# Patient Record
Sex: MALE | Race: WHITE | NOT HISPANIC OR LATINO | Employment: FULL TIME | URBAN - METROPOLITAN AREA
[De-identification: names, ages, dates, MRNs, and addresses within clinical notes are randomized per-mention and may not be internally consistent; named-entity substitution may affect disease eponyms.]

---

## 2018-02-26 ENCOUNTER — OFFICE VISIT (OUTPATIENT)
Dept: NEUROLOGY | Facility: CLINIC | Age: 23
End: 2018-02-26
Payer: COMMERCIAL

## 2018-02-26 ENCOUNTER — LAB VISIT (OUTPATIENT)
Dept: LAB | Facility: HOSPITAL | Age: 23
End: 2018-02-26
Attending: PSYCHIATRY & NEUROLOGY
Payer: COMMERCIAL

## 2018-02-26 VITALS
SYSTOLIC BLOOD PRESSURE: 178 MMHG | HEIGHT: 70 IN | RESPIRATION RATE: 16 BRPM | DIASTOLIC BLOOD PRESSURE: 80 MMHG | BODY MASS INDEX: 25.44 KG/M2 | WEIGHT: 177.69 LBS | HEART RATE: 78 BPM

## 2018-02-26 DIAGNOSIS — G72.3 PERIODIC PARALYSIS: Primary | ICD-10-CM

## 2018-02-26 DIAGNOSIS — G72.3 PERIODIC PARALYSIS: ICD-10-CM

## 2018-02-26 LAB
ALBUMIN SERPL BCP-MCNC: 4.6 G/DL
ALP SERPL-CCNC: 66 U/L
ALT SERPL W/O P-5'-P-CCNC: 24 U/L
ANION GAP SERPL CALC-SCNC: 9 MMOL/L
AST SERPL-CCNC: 20 U/L
BASOPHILS # BLD AUTO: 0.05 K/UL
BASOPHILS NFR BLD: 0.7 %
BILIRUB SERPL-MCNC: 0.4 MG/DL
BUN SERPL-MCNC: 13 MG/DL
CALCIUM SERPL-MCNC: 10.3 MG/DL
CERULOPLASMIN SERPL-MCNC: 26 MG/DL
CHLORIDE SERPL-SCNC: 103 MMOL/L
CK SERPL-CCNC: 227 U/L
CO2 SERPL-SCNC: 30 MMOL/L
CREAT SERPL-MCNC: 1 MG/DL
DIFFERENTIAL METHOD: ABNORMAL
EOSINOPHIL # BLD AUTO: 0.3 K/UL
EOSINOPHIL NFR BLD: 4.4 %
ERYTHROCYTE [DISTWIDTH] IN BLOOD BY AUTOMATED COUNT: 12.4 %
EST. GFR  (AFRICAN AMERICAN): >60 ML/MIN/1.73 M^2
EST. GFR  (NON AFRICAN AMERICAN): >60 ML/MIN/1.73 M^2
GLUCOSE SERPL-MCNC: 111 MG/DL
HCT VFR BLD AUTO: 40.9 %
HGB BLD-MCNC: 13.9 G/DL
LACTATE SERPL-SCNC: 1.3 MMOL/L
LYMPHOCYTES # BLD AUTO: 1.8 K/UL
LYMPHOCYTES NFR BLD: 24 %
MCH RBC QN AUTO: 28.9 PG
MCHC RBC AUTO-ENTMCNC: 34 G/DL
MCV RBC AUTO: 85 FL
MONOCYTES # BLD AUTO: 0.9 K/UL
MONOCYTES NFR BLD: 12.6 %
NEUTROPHILS # BLD AUTO: 4.3 K/UL
NEUTROPHILS NFR BLD: 58.3 %
PLATELET # BLD AUTO: 219 K/UL
PMV BLD AUTO: 9.8 FL
POTASSIUM SERPL-SCNC: 4.4 MMOL/L
PROT SERPL-MCNC: 8.2 G/DL
RBC # BLD AUTO: 4.81 M/UL
SODIUM SERPL-SCNC: 142 MMOL/L
TSH SERPL DL<=0.005 MIU/L-ACNC: 1.99 UIU/ML
VIT B12 SERPL-MCNC: 405 PG/ML
WBC # BLD AUTO: 7.33 K/UL

## 2018-02-26 PROCEDURE — 84443 ASSAY THYROID STIM HORMONE: CPT

## 2018-02-26 PROCEDURE — 82607 VITAMIN B-12: CPT

## 2018-02-26 PROCEDURE — 82085 ASSAY OF ALDOLASE: CPT

## 2018-02-26 PROCEDURE — 99999 PR PBB SHADOW E&M-NEW PATIENT-LVL III: CPT | Mod: PBBFAC,,, | Performed by: PSYCHIATRY & NEUROLOGY

## 2018-02-26 PROCEDURE — 85025 COMPLETE CBC W/AUTO DIFF WBC: CPT

## 2018-02-26 PROCEDURE — 99204 OFFICE O/P NEW MOD 45 MIN: CPT | Mod: S$GLB,,, | Performed by: PSYCHIATRY & NEUROLOGY

## 2018-02-26 PROCEDURE — 80053 COMPREHEN METABOLIC PANEL: CPT

## 2018-02-26 PROCEDURE — 3008F BODY MASS INDEX DOCD: CPT | Mod: S$GLB,,, | Performed by: PSYCHIATRY & NEUROLOGY

## 2018-02-26 PROCEDURE — 82550 ASSAY OF CK (CPK): CPT

## 2018-02-26 PROCEDURE — 36415 COLL VENOUS BLD VENIPUNCTURE: CPT

## 2018-02-26 PROCEDURE — 83605 ASSAY OF LACTIC ACID: CPT

## 2018-02-26 PROCEDURE — 82390 ASSAY OF CERULOPLASMIN: CPT

## 2018-02-26 NOTE — PROGRESS NOTES
"    HPI: Sonu Osorio III is a 23 y.o. male with possible seizures or other spells. His father has the same but does not get any specific treatment. Patient's symptoms have been ongoing for years, after puberty. Spells occur variably- he has not had any spells for 2-3 days but can have 5 within an hour on some days. Spells last only about 30 seconds.   Spells are described as him first feeling his mouth or hands clinching up and he he has slurring of his speech. He has no pain or cramping. He is fully awake and thinks well during the spells per him, witnesses say his eyes appear wide and mouth droops to one side (but not always the same side). He also can have right or left leg tension (as well as in the hands) but he has had the same on both sides. There is posturing of the arms (either one or both arms). His neck will bend to the same side as the posturing. There is no jerking or rhythmic shaking and spells are so brief he can "Camoflouge" it by tieing his shoe. He feels no control over the symptoms- he just turns away from others if it happens. He feels paralyzed during spells.   He has no history of evolving tics.   Years ago, patient had EEG but never an MRI or CT of the head- he was told EEG was normal as discussed with him about Dr Davidson.   Patient's father had symptoms which he felt he grew out of and patient's sister has had some similar symptoms and was diagnosed with partial complex seizures but had automatisms and amnesia with her spells.   Otherwise have tried to video tape this but it happens too quick for this.  Patient feels movement triggers this.   States today's BP is an outlier  He is a student and works as [employer].   Review of Systems   Constitutional: Negative for fever.   HENT: Negative for nosebleeds.    Eyes: Negative for double vision.   Respiratory: Negative for hemoptysis.    Cardiovascular: Negative for leg swelling.   Gastrointestinal: Negative for blood in stool.   Genitourinary: " Negative for hematuria.   Musculoskeletal: Negative for falls.   Skin: Negative for rash.   Neurological: Negative for seizures.         I have reviewed all of this patient's past medical and surgical histories as well as family and social histories and active allergies and medications as documented in the electronic medical record.    Exam:  Gen Appearance, well developed/nourished in no apparent distress  CV: 2+ distal pulses with no edema or swelling  Neuro:  MS: Awake, alert, oriented to place, person, time, situation. Sustains attention. Recent/remote memory intact, Language is full to spontaneous speech/repetition/naming/comprehension. Fund of Knowledge is full  CN: Optic discs are flat with normal vasculature, PERRL, Extraoccular movements and visual fields are full. Normal facial sensation and strength, Hearing symmetric, Tongue and Palate are midline and strong. Shoulder Shrug symmetric and strong.  Motor: Normal bulk, tone, no abnormal movements. 5/5 strength bilateral upper/lower extremities with 2+ reflexes and bilateral plantar response  Sensory: symmetric to light touch, pain, temp, and vibration/proprioception. Romberg negative  Cerebellar: Finger-nose,Heal-shin, Rapid alternating movements intact  Gait: Normal stance, no ataxia      Assessment/Plan: Sonu Osorio III is a 23 y.o. male with abnormal involuntary movements, often after activities and paralyzing. A periodic paralysis or other neuromuscular disorde given likely genetic component.   I recommend:   1. MRI brain  to rule out structural lesion causing symptoms/findings.   2. Labs: CMP, CBC,copper/ceruloplasim,  TSh, B12, CK, aldolase,  lactic acid.   3. I asked the patient to take more extensive video of his spells. I will refer him to neuromuscular specialist for further diagnosis and treatment. May benefit exercise testing and/or EMG.  Patient would be interested in genetic evaluation at some point.   RTC 6 months

## 2018-02-28 LAB — ALDOLASE SERPL-CCNC: 8.1 U/L

## 2018-03-02 ENCOUNTER — HOSPITAL ENCOUNTER (OUTPATIENT)
Dept: RADIOLOGY | Facility: HOSPITAL | Age: 23
Discharge: HOME | End: 2018-03-02
Attending: PSYCHIATRY & NEUROLOGY
Payer: COMMERCIAL

## 2018-03-02 ENCOUNTER — TELEPHONE (OUTPATIENT)
Dept: NEUROLOGY | Facility: CLINIC | Age: 23
End: 2018-03-02

## 2018-03-02 DIAGNOSIS — G72.3 PERIODIC PARALYSIS: ICD-10-CM

## 2018-03-02 PROCEDURE — A9585 GADOBUTROL INJECTION: HCPCS | Performed by: PSYCHIATRY & NEUROLOGY

## 2018-03-02 PROCEDURE — 70553 MRI BRAIN STEM W/O & W/DYE: CPT | Mod: 26,,, | Performed by: RADIOLOGY

## 2018-03-02 PROCEDURE — 25500020 PHARM REV CODE 255: Performed by: PSYCHIATRY & NEUROLOGY

## 2018-03-02 PROCEDURE — 70553 MRI BRAIN STEM W/O & W/DYE: CPT | Mod: TC

## 2018-03-02 RX ORDER — GADOBUTROL 604.72 MG/ML
8 INJECTION INTRAVENOUS
Status: COMPLETED | OUTPATIENT
Start: 2018-03-02 | End: 2018-03-02

## 2018-03-02 RX ADMIN — GADOBUTROL 8 ML: 604.72 INJECTION INTRAVENOUS at 01:03

## 2018-03-02 NOTE — TELEPHONE ENCOUNTER
----- Message from Arcadio Fernandez sent at 3/2/2018  3:31 PM CST -----  The patient would like to speak to someone regarding scheduling his appointment. Please contact the patient to discuss further.

## 2018-03-26 NOTE — PROGRESS NOTES
Patient Name: Sonu Osorio III  MRN: [id number]    CC: Query periodic paralysis    HPI: Sonu Osorio III is a 23 y.o. male who is here today to discuss recurrent spells.  He says that they began to occur right after puberty. During a spell, he has episodic muscle contraction lasting 5-30sec and associated with an alteration in feeling, but he's aware of everything that is happening around him.     Sudden movement or exercise seems to bring them on. In a typical day, may have 10 or so, but on 'bad' days, may have too many to count. May go days/weeks without them.     Dad had them beginning in early teens and grew out of them around 19-21yo    Sister has CPS disorder - they look different and she has memory/awareness issues.     No cramping/weakness    HyperK+     HypoK+  Provocative factors    Provocactive factors  Exercise - no     Carbohydrates - no  Potassium load - no    Rest after exercise - no  Cold environment -no    Cold > Heat - no  Stress - no     Stress no  Fasting - don't miss meals       Ethanol - don't know     Alleviating factors  Carbohydrate intake - no  Mild exercise - no        Review of Systems   Constitutional: Negative for activity change, appetite change, chills, diaphoresis, fatigue, fever and unexpected weight change.   HENT: Negative for congestion, drooling, trouble swallowing and voice change.    Eyes: Negative for photophobia and visual disturbance.   Respiratory: Negative for choking, shortness of breath and stridor.    Cardiovascular: Negative for chest pain and leg swelling.   Gastrointestinal: Negative for abdominal pain, constipation, diarrhea, nausea and vomiting.   Endocrine: Negative for cold intolerance and heat intolerance.   Genitourinary: Negative for difficulty urinating, dysuria and urgency.   Musculoskeletal: Negative for back pain, gait problem, myalgias, neck pain and neck stiffness.   Skin: Negative for color change and rash.   Allergic/Immunologic: Negative for environmental  "allergies and food allergies.   Neurological: Negative for dizziness, tremors, facial asymmetry, speech difficulty, weakness, numbness and headaches.   Psychiatric/Behavioral: Negative for confusion, decreased concentration and hallucinations. The patient is not nervous/anxious.        Past Medical History  History reviewed. No pertinent past medical history.    Medications  No current outpatient prescriptions on file.    Allergies  Review of patient's allergies indicates:  No Known Allergies    Social History  Social History     Social History    Marital status: Single     Spouse name: N/A    Number of children: N/A    Years of education: N/A     Occupational History    Works at [employer]     Social History Main Topics    Smoking status: Never Smoker    Smokeless tobacco: Never Used    Alcohol use No    Drug use: No    Sexual activity: Not on file     Other Topics Concern    Not on file     Social History Narrative    No narrative on file       Family History  Family History   Problem Relation Age of Onset    Movement disorder Father        Physical Exam  /63   Pulse 62   Ht 5' 10" (1.778 m)   Wt 82.3 kg (181 lb 7 oz)   BMI 26.03 kg/m²     General appearance: Well-developed, well-groomed.     Neurologic Exam: The patient is awake, alert and oriented. Language is fluent. Recent and remote memory are normal. Attention span and concentration are normal. Fund of knowledge is appropriate.     Cranial nerves: pupils are round and reactive to light and accommodation. Visual fields are full to confrontation. Fundoscopic exam reveals sharp discs bilaterally, with good venous pulsations. Ocular motility is full in all cardinal positions of gaze. Facial sensation is normal to pinprick and light touch. Corneal reflexes are present bilaterally. Facial activation is symmetric. Hearing is normal bilaterally. Palate elevates symmetrically and gag reflex is intact bilaterally. Shoulder elevation is " symmetric and full strength bilaterally. Tongue is midline and neck rotation strength is normal bilaterally. Neck range of motion is normal.     Motor examination of all extremities demonstrates normal bulk and tone in all four limbs. There are no atrophy or fasciculations. Strength is 5/5 in the upper and lower extremities bilaterally without pronator drift.     Sensory examination is normal to pinprick, vibration and proprioception in the upper and lower extremities bilaterally. Romberg is negative.    Deep tendon reflexes are 2+ and symmetric in the upper and lower extremities bilaterally. Toes are mute bilaterally.     Gait: Normal heel, toe, tandem, and casual gait.    Coordination: Finger to nose and heel to shin testing is normal in both upper and lower extremities. Rapid alternating movements are normal in both upper and lower extremities.     General exam  Cardiovascular: regular rate and rhythm with no murmurs, rubs or gallops. There are no carotid or vertebral artery bruits. Pulses in both upper and lower extremities are symmetric. There is no peripheral edema.   Head and neck: no cervical lymphadenopathy      He showed me some short videos. In them, he will run in place for 10-15 seconds and then has flexion of right/fingers, right laterocollis, and what appears to be an alteration in his sensorium (wide, fixed eyes, postural imbalance). All of this transpires over about 5 seconds, so it required multiple viewings to detect all of the changes. He is back to baseline immediately.    Images:   Brain MRI 3/2/18: Normal      2/26/18  CK: 227  B12, TSH, CBC, CMP, Ceruloplasmin: nl      Assessment and Plan      Problem List Items Addressed This Visit     Movement disorder - Primary    Current Assessment & Plan     22yo male with several years of episodic spells, possibly provoked by stimulus.     DDX: paroxysmal kinesogenic dystonia, seizure, ischemia (vertebral or basilar insufficiency)    Plan  1. MRI/A  head/neck  2. Tilt table with EEG  3. If those two are normal, will refer to Dr Mcdonald for PKD         Relevant Orders    MRI Brain Without Contrast    MRA Brain without contrast    MRA Neck with and without contrast    Ambulatory Referral to Cardiology                Bipin Ortiz MD, BONILLA, FAAN  Department of Neurology  [facility] [place] [address]

## 2018-03-27 ENCOUNTER — OFFICE VISIT (OUTPATIENT)
Dept: NEUROLOGY | Facility: CLINIC | Age: 23
End: 2018-03-27
Payer: COMMERCIAL

## 2018-03-27 VITALS
DIASTOLIC BLOOD PRESSURE: 63 MMHG | HEIGHT: 70 IN | SYSTOLIC BLOOD PRESSURE: 138 MMHG | HEART RATE: 62 BPM | WEIGHT: 181.44 LBS | BODY MASS INDEX: 25.98 KG/M2

## 2018-03-27 DIAGNOSIS — G25.9 MOVEMENT DISORDER: Primary | ICD-10-CM

## 2018-03-27 PROCEDURE — 99999 PR PBB SHADOW E&M-EST. PATIENT-LVL III: CPT | Mod: PBBFAC,,, | Performed by: PSYCHIATRY & NEUROLOGY

## 2018-03-27 PROCEDURE — 99215 OFFICE O/P EST HI 40 MIN: CPT | Mod: S$GLB,,, | Performed by: PSYCHIATRY & NEUROLOGY

## 2018-03-27 NOTE — Clinical Note
Will need MRI/A, cardiology referral for Cata-Kimberly, tilt table with EEG,  and a referral to Dr Mcdonald after those are completed.

## 2018-03-27 NOTE — ASSESSMENT & PLAN NOTE
22yo male with several years of episodic spells, possibly provoked by stimulus.     DDX: paroxysmal kinesogenic dystonia, seizure, ischemia (vertebral or basilar insufficiency)    Plan  1. MRI/A head/neck  2. Tilt table with EEG  3. If those two are normal, will refer to Dr Mcdonald for PKD

## 2018-03-27 NOTE — LETTER
March 27, 2018      Gasper Lowe MD  [address] [address]           [facility]  [address]  [facility] [address]  Phone: [phone]  Fax: [phone]          Patient: Sonu Osorio III   MR Number: [id number]   YOB: 1995   Date of Visit: 3/27/2018       Dear Dr. Gasper Lowe:    Thank you for referring Sonu Osorio to me for evaluation. Attached you will find relevant portions of my assessment and plan of care.    If you have questions, please do not hesitate to call me. I look forward to following Sonu Osorio along with you.    Sincerely,    Arturo Ortiz MD    Enclosure  CC:  No Recipients    If you would like to receive this communication electronically, please contact [email or web address] or [phone] to request more information on [employer] Link access.    For providers and/or their staff who would like to refer a patient to [facility], please contact us through our one-stop-shop provider referral line, [facility], at [phone].    If you feel you have received this communication in error or would no longer like to receive these types of communications, please e-mail [email or web address]

## 2018-04-27 ENCOUNTER — INITIAL CONSULT (OUTPATIENT)
Dept: ELECTROPHYSIOLOGY | Facility: CLINIC | Age: 23
End: 2018-04-27
Payer: COMMERCIAL

## 2018-04-27 ENCOUNTER — HOSPITAL ENCOUNTER (OUTPATIENT)
Dept: CARDIOLOGY | Facility: CLINIC | Age: 23
Discharge: HOME | End: 2018-04-27
Payer: COMMERCIAL

## 2018-04-27 VITALS
WEIGHT: 183 LBS | HEART RATE: 66 BPM | BODY MASS INDEX: 26.2 KG/M2 | OXYGEN SATURATION: 98 % | HEIGHT: 70 IN | SYSTOLIC BLOOD PRESSURE: 119 MMHG | DIASTOLIC BLOOD PRESSURE: 68 MMHG

## 2018-04-27 DIAGNOSIS — R25.1 SPELLS OF TREMBLING: ICD-10-CM

## 2018-04-27 DIAGNOSIS — G25.9 MOVEMENT DISORDER: ICD-10-CM

## 2018-04-27 PROCEDURE — 93000 ELECTROCARDIOGRAM COMPLETE: CPT | Mod: S$GLB,,, | Performed by: INTERNAL MEDICINE

## 2018-04-27 PROCEDURE — 99999 PR PBB SHADOW E&M-EST. PATIENT-LVL III: CPT | Mod: PBBFAC,,, | Performed by: INTERNAL MEDICINE

## 2018-04-27 PROCEDURE — 99245 OFF/OP CONSLTJ NEW/EST HI 55: CPT | Mod: S$GLB,,, | Performed by: INTERNAL MEDICINE

## 2018-04-27 NOTE — PROGRESS NOTES
"  Subjective:   Patient ID:  Sonu Osorio III is a 23 y.o. male     Chief complaint:No chief complaint on file.      HPI  New patient to me.  Referred by Dr Ortiz  for spells   --   From his notes:  Sonu Osorio III is a 23 y.o. male with possible seizures or other spells. His father has the same but does not get any specific treatment. Patient's symptoms have been ongoing for years, after puberty. Spells occur variably- he has not had any spells for 2-3 days but can have 5 within an hour on some days. Spells last only about 30 seconds.   Spells are described as him first feeling his mouth or hands clinching up and he he has slurring of his speech. He has no pain or cramping. He is fully awake and thinks well during the spells per him, witnesses say his eyes appear wide and mouth droops to one side (but not always the same side). He also can have right or left leg tension (as well as in the hands) but he has had the same on both sides. There is posturing of the arms (either one or both arms). His neck will bend to the same side as the posturing. There is no jerking or rhythmic shaking and spells are so brief he can "Camouflage" it by tieing his shoe. He feels no control over the symptoms- he just turns away from others if it happens. He feels paralyzed during spells.   He has no history of evolving tics.   Years ago, patient had EEG but never an MRI or CT of the head- he was told EEG was normal as discussed with him about Dr Davidson.     He says that the spells began to occur right after puberty. During a spell, he has episodic muscle contraction lasting 5-30sec and associated with an alteration in feeling, but he's aware of everything that is happening around him.   Sudden movement or exercise seems to bring them on. In a typical day, may have 10 or so, but on 'bad' days, may have too many to count. May go days/weeks without them. Dad had them beginning in early teens and grew out of them around 19-21yo   Sister " has CPS disorder - they look different and she has memory/awareness issues.      No cramping/weakness     On a subsequent visit patient  showed  some short videos. In them, he will run in place for 10-15 seconds and then has flexion of right/fingers, right laterocollis, and what appears to be an alteration in his sensorium (wide, fixed eyes, postural imbalance). All of this transpires over about 5 seconds, so it required multiple viewings to detect all of the changes. He is back to baseline immediately.     Images:   Brain MRI 3/2/18: Normal        2/26/18  CK: 227  B12, TSH, CBC, CMP, Ceruloplasmin: nl    Today patient is here with his SO and they showed me the video of one of the event -- the description above is pretty accurate   Patient confirms that the triggers are generally -- quick movements  He gets a funky feeling just before  He was referred to me to look into the possibility of a hypotensive episode starting these.   Gets them often -- short lasting  No current outpatient prescriptions on file.     No current facility-administered medications for this visit.      Review of Systems   Constitution: Negative for decreased appetite, weakness, malaise/fatigue, weight gain and weight loss.   Eyes: Negative for blurred vision.   Cardiovascular: Negative for chest pain, claudication, cyanosis, dyspnea on exertion, irregular heartbeat, leg swelling, near-syncope, orthopnea and palpitations.   Respiratory: Positive for snoring. Negative for cough, shortness of breath, sleep disturbances due to breathing and wheezing.    Endocrine: Negative for heat intolerance.   Hematologic/Lymphatic: Does not bruise/bleed easily.   Musculoskeletal: Negative for muscle weakness and myalgias.   Gastrointestinal: Negative for melena, nausea and vomiting.   Genitourinary: Negative for nocturia.   Neurological: Negative for excessive daytime sleepiness, dizziness, headaches and light-headedness.   Psychiatric/Behavioral: Negative for  depression, memory loss and substance abuse. The patient does not have insomnia and is not nervous/anxious.        Objective:   Physical Exam   Constitutional: He is oriented to person, place, and time. He appears well-developed and well-nourished.   HENT:   Head: Normocephalic and atraumatic.   Right Ear: External ear normal.   Left Ear: External ear normal.   Eyes: Conjunctivae are normal. Pupils are equal, round, and reactive to light. Right eye exhibits no discharge. Left eye exhibits no discharge. Right conjunctiva is not injected. Left conjunctiva has no hemorrhage.   Neck: Neck supple. No JVD present. No thyromegaly present.   Cardiovascular: Normal rate, regular rhythm, normal heart sounds and intact distal pulses.  PMI is not displaced.  Exam reveals no gallop, no friction rub, no midsystolic click and no opening snap.    No murmur heard.  Pulses:       Carotid pulses are 2+ on the right side, and 2+ on the left side.       Radial pulses are 2+ on the right side, and 2+ on the left side.        Dorsalis pedis pulses are 2+ on the right side, and 2+ on the left side.        Posterior tibial pulses are 2+ on the right side, and 2+ on the left side.   Pulmonary/Chest: Effort normal and breath sounds normal. No respiratory distress. He has no wheezes. He has no rales. He exhibits no tenderness.   Abdominal: Soft. Normal appearance. He exhibits no pulsatile liver. There is no hepatomegaly. There is no tenderness. There is no rebound and no guarding.   Musculoskeletal: Normal range of motion. He exhibits no edema or tenderness.        Right knee: He exhibits no swelling.        Left knee: He exhibits no swelling.        Right ankle: He exhibits no swelling.        Left ankle: He exhibits no swelling.        Right lower leg: He exhibits no swelling.        Left lower leg: He exhibits no swelling.        Right foot: There is no swelling.        Left foot: There is no swelling.   Neurological: He is alert and  "oriented to person, place, and time. He has normal strength and normal reflexes. No cranial nerve deficit. Coordination normal.   Skin: Skin is warm, dry and intact. No rash noted. He is not diaphoretic. No cyanosis.   Psychiatric: He has a normal mood and affect. His behavior is normal.   Nursing note and vitals reviewed.    /68   Pulse 66   Ht 5' 10" (1.778 m)   Wt 83 kg (183 lb)   SpO2 98%   BMI 26.26 kg/m²      Assessment:    The description of the events does not suggest any dysautonomic etiology.This is more likely to be a focal seizure. However, we may be able to help with the Dx if an EEG is obtained during a HUT with Isuprel (which may mimic the adrenergic triggering that he seems to have for these to occur.   On the other hand, it may be best to fit him  with an ambulatory EEG since he seems to have frequent episodes.  1. Spells of trembling        Plan:      No orders of the defined types were placed in this encounter.    Follow-up if symptoms worsen or fail to improve.  There are no discontinued medications.  New Prescriptions    No medications on file     Modified Medications    No medications on file                "

## 2018-04-27 NOTE — LETTER
April 29, 2018      Arturo Ortiz MD  [address]  [facility] [address]           [facility] - Arrhythmia  [address]  [facility] [address]  Phone: [phone]  Fax: [phone]          Patient: Sonu Osorio III   MR Number: [id number]   YOB: 1995   Date of Visit: 4/27/2018       Dear Dr. Arturo Ortiz:    Thank you for referring Sonu Osorio to me for evaluation. Attached you will find relevant portions of my assessment and plan of care.    If you have questions, please do not hesitate to call me. I look forward to following Sonu Osorio along with you.    Sincerely,    Gregg Dhillon MD    Enclosure  CC:  No Recipients    If you would like to receive this communication electronically, please contact [email or web address] or [phone] to request more information on [employer] Link access.    For providers and/or their staff who would like to refer a patient to [facility], please contact us through our one-stop-shop provider referral line, [facility], at [phone].    If you feel you have received this communication in error or would no longer like to receive these types of communications, please e-mail [email or web address]

## 2018-04-29 PROBLEM — R25.1 SPELLS OF TREMBLING: Status: ACTIVE | Noted: 2018-04-29

## 2018-04-30 DIAGNOSIS — G25.9 MOVEMENT DISORDER: Primary | ICD-10-CM

## 2018-06-28 ENCOUNTER — TELEPHONE (OUTPATIENT)
Dept: ELECTROPHYSIOLOGY | Facility: CLINIC | Age: 23
End: 2018-06-28

## 2018-07-27 ENCOUNTER — PATIENT MESSAGE (OUTPATIENT)
Dept: ELECTROPHYSIOLOGY | Facility: CLINIC | Age: 23
End: 2018-07-27

## 2018-07-31 ENCOUNTER — TELEPHONE (OUTPATIENT)
Dept: ELECTROPHYSIOLOGY | Facility: CLINIC | Age: 23
End: 2018-07-31

## 2018-07-31 NOTE — TELEPHONE ENCOUNTER
Spoke with patient he is not interested in pursuing to have tilt table done, routed message to .    Alex MTZ CCM

## 2023-12-28 PROBLEM — G47.19 EXCESSIVE DAYTIME SLEEPINESS: Status: ACTIVE | Noted: 2023-12-28

## 2023-12-28 PROBLEM — R06.83 SNORING: Status: ACTIVE | Noted: 2023-12-28

## 2024-01-04 ENCOUNTER — HOSPITAL ENCOUNTER (OUTPATIENT)
Dept: SLEEP MEDICINE | Facility: HOSPITAL | Age: 29
Discharge: HOME | End: 2024-01-04
Attending: NURSE PRACTITIONER
Payer: COMMERCIAL

## 2024-01-04 DIAGNOSIS — R06.83 SNORING: ICD-10-CM

## 2024-01-04 DIAGNOSIS — G47.19 EXCESSIVE DAYTIME SLEEPINESS: ICD-10-CM

## 2024-01-04 PROCEDURE — 95810 POLYSOM 6/> YRS 4/> PARAM: CPT

## 2024-02-26 PROBLEM — G47.8 UPPER AIRWAY RESISTANCE SYNDROME: Status: ACTIVE | Noted: 2024-02-26

## 2024-08-07 ENCOUNTER — TELEPHONE (OUTPATIENT)
Dept: OBSTETRICS AND GYNECOLOGY | Facility: CLINIC | Age: 29
End: 2024-08-07
Payer: COMMERCIAL

## 2024-08-07 DIAGNOSIS — Z13.71 TESTING FOR GENETIC DISEASE CARRIER STATUS: Primary | ICD-10-CM

## 2024-09-25 ENCOUNTER — LAB VISIT (OUTPATIENT)
Dept: LAB | Facility: HOSPITAL | Age: 29
End: 2024-09-25
Attending: OBSTETRICS & GYNECOLOGY
Payer: COMMERCIAL

## 2024-09-25 DIAGNOSIS — Z13.71 TESTING FOR GENETIC DISEASE CARRIER STATUS: ICD-10-CM

## 2024-09-25 PROCEDURE — 36415 COLL VENOUS BLD VENIPUNCTURE: CPT | Performed by: OBSTETRICS & GYNECOLOGY

## 2024-10-18 ENCOUNTER — TELEPHONE (OUTPATIENT)
Dept: OBSTETRICS AND GYNECOLOGY | Facility: CLINIC | Age: 29
End: 2024-10-18
Payer: COMMERCIAL

## 2024-10-18 NOTE — TELEPHONE ENCOUNTER
----- Message from Med Assistant Chávez sent at 10/18/2024 10:38 AM CDT -----  Contact: Padma / spouse  Sonu Osorio III  MRN: [id number]  : 1995  PCP: Luanne Almendarez  Home Phone      [phone]  Work Phone      Not on file.  Mobile          [phone]      MESSAGE:  Would like results.    Phone:  [phone]

## 2024-10-18 NOTE — TELEPHONE ENCOUNTER
Called Padma and informed her pt results are on Dr. Cabrera deslor to review. Informed her once reviewed I will call Sonu and inform him of results. Padma voiced understanding.

## 2024-10-18 NOTE — TELEPHONE ENCOUNTER
----- Message from Laura sent at 10/18/2024  1:38 PM CDT -----  Contact: [facility]  Sonu Osorio III  MRN: [id number]  Home Phone      [phone]  Work Phone      Not on file.  Mobile          [phone]    Patient Care Team:  Luanne Almendarez MD as PCP - General (Family Medicine)  OB? No  What phone number can you be reached at? [phone]  Message: calling in regards to abnormal result on positive beacon barrier report

## 2024-10-18 NOTE — TELEPHONE ENCOUNTER
Contacted [facility] and spoke with Rebecca. Confirmed with her that results were received and she voiced understanding.